# Patient Record
Sex: FEMALE | Race: BLACK OR AFRICAN AMERICAN
[De-identification: names, ages, dates, MRNs, and addresses within clinical notes are randomized per-mention and may not be internally consistent; named-entity substitution may affect disease eponyms.]

---

## 2018-02-26 ENCOUNTER — HOSPITAL ENCOUNTER (INPATIENT)
Dept: HOSPITAL 17 - HSDI | Age: 63
LOS: 3 days | Discharge: NURSING, REHAB OR LONG-TERM CARE | DRG: 470 | End: 2018-03-01
Attending: ORTHOPAEDIC SURGERY | Admitting: ORTHOPAEDIC SURGERY
Payer: COMMERCIAL

## 2018-02-26 VITALS — WEIGHT: 205.91 LBS | HEIGHT: 63 IN | BODY MASS INDEX: 36.48 KG/M2

## 2018-02-26 VITALS
SYSTOLIC BLOOD PRESSURE: 127 MMHG | TEMPERATURE: 96.6 F | RESPIRATION RATE: 18 BRPM | HEART RATE: 72 BPM | OXYGEN SATURATION: 96 % | DIASTOLIC BLOOD PRESSURE: 68 MMHG

## 2018-02-26 VITALS
DIASTOLIC BLOOD PRESSURE: 75 MMHG | OXYGEN SATURATION: 94 % | RESPIRATION RATE: 15 BRPM | TEMPERATURE: 97.5 F | SYSTOLIC BLOOD PRESSURE: 129 MMHG | HEART RATE: 80 BPM

## 2018-02-26 DIAGNOSIS — M65.861: ICD-10-CM

## 2018-02-26 DIAGNOSIS — M21.061: ICD-10-CM

## 2018-02-26 DIAGNOSIS — M06.9: ICD-10-CM

## 2018-02-26 DIAGNOSIS — E78.5: ICD-10-CM

## 2018-02-26 DIAGNOSIS — J45.909: ICD-10-CM

## 2018-02-26 DIAGNOSIS — K21.9: ICD-10-CM

## 2018-02-26 DIAGNOSIS — M17.11: Primary | ICD-10-CM

## 2018-02-26 PROCEDURE — 86900 BLOOD TYPING SEROLOGIC ABO: CPT

## 2018-02-26 PROCEDURE — 86920 COMPATIBILITY TEST SPIN: CPT

## 2018-02-26 PROCEDURE — 73560 X-RAY EXAM OF KNEE 1 OR 2: CPT

## 2018-02-26 PROCEDURE — 85014 HEMATOCRIT: CPT

## 2018-02-26 PROCEDURE — L1830 KO IMMOB CANVAS LONG PRE OTS: HCPCS

## 2018-02-26 PROCEDURE — 86901 BLOOD TYPING SEROLOGIC RH(D): CPT

## 2018-02-26 PROCEDURE — 86850 RBC ANTIBODY SCREEN: CPT

## 2018-02-26 PROCEDURE — 3E0T3BZ INTRODUCTION OF ANESTHETIC AGENT INTO PERIPHERAL NERVES AND PLEXI, PERCUTANEOUS APPROACH: ICD-10-PCS | Performed by: ANESTHESIOLOGY

## 2018-02-26 PROCEDURE — C1776 JOINT DEVICE (IMPLANTABLE): HCPCS

## 2018-02-26 PROCEDURE — 0SRC0J9 REPLACEMENT OF RIGHT KNEE JOINT WITH SYNTHETIC SUBSTITUTE, CEMENTED, OPEN APPROACH: ICD-10-PCS | Performed by: ORTHOPAEDIC SURGERY

## 2018-02-26 PROCEDURE — 94150 VITAL CAPACITY TEST: CPT

## 2018-02-26 PROCEDURE — 85018 HEMOGLOBIN: CPT

## 2018-02-26 RX ADMIN — ASPIRIN SCH MG: 81 TABLET ORAL at 21:19

## 2018-02-26 RX ADMIN — OXYBUTYNIN CHLORIDE SCH MG: 5 TABLET ORAL at 21:11

## 2018-02-26 RX ADMIN — LISINOPRIL SCH MG: 20 TABLET ORAL at 21:11

## 2018-02-26 RX ADMIN — OXYTOCIN SCH MLS/HR: 10 INJECTION, SOLUTION INTRAMUSCULAR; INTRAVENOUS at 15:06

## 2018-02-26 NOTE — MP
cc:

Ozzie Del Rosario MD

****

 

 

DATE OF OPERATION:

02/26/2018

 

PREOPERATIVE DIAGNOSIS:

Right knee severe tricompartment osteoarthritis, genu valgus 

deformity.

 

POSTOPERATIVE DIAGNOSIS:

Right knee severe tricompartment osteoarthritis, genu valgus 

deformity.

 

PROCEDURE:

Right total knee arthroplasty.

 

SURGEON:

Ozzie Del Rosario MD

 

ASSISTANT:

Eli Luevano PA-C

 

ANESTHESIA:

General, regional block.

 

COMPLICATIONS:

None.

 

ESTIMATED BLOOD LOSS:

50 mL.

 

DRAINS:

2.

 

TOURNIQUET TIME:

54 minutes at 275 mmHg.

 

CONDITION:

Stable.

 

PLAN OF ACTIVITY:

Per orders.

 

PROCEDURE:

My assistant, Eli Luevano, was present for the entire surgical 

case.   She was medically necessary for the entire case because of the

complexity of the case and to facilitate the performance of the 

procedure and with the  retractors, trial implants, permanent implants 

including bone cement.

 

Patient brought in the operating room, had satisfactory anesthesia by 

Dr. Sexton, department of anesthesia.  The right lower extremity was 

prepped and draped in usual sterile manner.  The extremity was 

exsanguinated by elevation and tourniquet placed to 275 mmHg.

 

A small anterior exposure to the was made.  Paramedian capsulotomy was

performed.  Patient was found to have significant synovitis to the 

knee with severe tricompartment osteoarthritis.  The remaining 

portions of the medial and lateral meniscus were removed.  Prepatellar

fat pad was excised.  The anterior cruciate ligament was resected and 

the posterior cruciate ligament was preserved.  Using the [employer] 

Outlist[employer] total knee arthroplasty system, IM guide was used for the 

distal femur cuts.  This was 5 degrees of valgus to accept a 62.5 mm 

femoral component.  Extramedullary guide was used for the tibia and 

this was to accept a 71 mm tibial component.  The undersurface of the 

patella was removed and this was to accept a 31 mm 3-pronged patellar 

prosthesis.  Patient found to have satisfactory balance in both 

flexion and extension with a 12 mm insert.  All trial components were 

removed.  Preparation for cementing was made.  This was 2 packages of 

[place] bone cement.  First, the tibial component was cemented, 

followed by the femoral component, then the patellar component.  When 

all excess bone cement was removed, the bone cement was allowed to 

harden for 11 minutes.  A 12 x 71 mm polyethylene plastic was used, in

which it was "cupped".  Again, patient was found to have excellent 

range of motion range of motion and satisfactory stability of both 

flexion and extension gaps.  The knee was irrigated with 3000 mL of 

sterile saline antibiotic solution.  Wound itself was dry.  The 

tourniquet was deflated.  All bleeders were coagulated.  The knee was 

irrigated with local anesthesia prior to the tourniquet coming down.  

The medication was provided by the department of pharmacy.  The 

lateral retinacular release was performed.  The patella was found to 

track well within the patellofemoral compartment with the "no thumbs 

technique".  The extension mechanism was repaired using multiple 

interrupted #2 Ti-Cron, subcutaneous tissue later with 0 Vicryl and 

3-0 Vicryl.  Skin was approximated with skin staples.  Sterile 

dressings were applied.  Patient tolerated the procedure well, went to

recovery room in stable and satisfactory condition.

 

 

__________________________________

MD ENMANUEL Cobos/GAMALIEL

D: 02/26/2018, 02:55 PM

T: 02/26/2018, 04:29 PM

Visit #: [id number]

Job #: [id number]

ZAFAR

## 2018-02-26 NOTE — RADRPT
EXAM DATE/TIME:  02/26/2018 15:19 

 

[facility] COMPARISON:     

No previous studies available for comparison.

 

                     

INDICATIONS :     

Post op right knee.

                     

 

MEDICAL HISTORY :     

None.          

 

SURGICAL HISTORY :     

None.   

 

ENCOUNTER:     

Initial                                        

 

ACUITY:     

1 day      

 

PAIN SCORE:     

Non-responsive.

 

LOCATION:     

Right  knee.

 

FINDINGS:     

Total knee arthroplasty is present. Hardware is intact. Alignment is anatomic. Surgical drains are no
yao. Skin staples are present ventrally.

 

CONCLUSION:     

Satisfactory post right TKA

 

 

 

 Gino Mclain MD on February 26, 2018 at 16:59           

Board Certified Radiologist.

 This report was verified electronically.

## 2018-02-26 NOTE — HHI.PR
__________________________________________________





Immediate Post Op Note


Procedure Date:


Feb 26, 2018


Pre Op Diagnosis:  


R Knee severe OA,RA,genu valgus deformity


Post Op Diagnosis:  


Same


Surgeon:


Ozzie Del Rosario MD


Assistant(s):


Eli Luevano PA-C


Procedure:


R TKR


Complications:


None


Specimen(s) removed:


None


Estimated blood loss:


50cc


Anesthesia:  General, Regional Block, Local


Drains:  Hemovac


Patient to:  PACU


Patient Condition:  Good


Implant/Devices:  SEE IMPLANT LOG (if applicable)


Date/Time of Procedure:  SEE SURGICAL CARE RECORD











Ozzie Del Rosario MD Feb 26, 2018 15:08

## 2018-02-27 VITALS
TEMPERATURE: 96.8 F | DIASTOLIC BLOOD PRESSURE: 60 MMHG | RESPIRATION RATE: 17 BRPM | SYSTOLIC BLOOD PRESSURE: 108 MMHG | HEART RATE: 71 BPM | OXYGEN SATURATION: 97 %

## 2018-02-27 VITALS
TEMPERATURE: 97.8 F | OXYGEN SATURATION: 97 % | RESPIRATION RATE: 15 BRPM | DIASTOLIC BLOOD PRESSURE: 66 MMHG | HEART RATE: 73 BPM | SYSTOLIC BLOOD PRESSURE: 106 MMHG

## 2018-02-27 VITALS
RESPIRATION RATE: 18 BRPM | SYSTOLIC BLOOD PRESSURE: 108 MMHG | HEART RATE: 93 BPM | DIASTOLIC BLOOD PRESSURE: 67 MMHG | TEMPERATURE: 98.3 F | OXYGEN SATURATION: 97 %

## 2018-02-27 VITALS
SYSTOLIC BLOOD PRESSURE: 129 MMHG | DIASTOLIC BLOOD PRESSURE: 82 MMHG | HEART RATE: 86 BPM | RESPIRATION RATE: 20 BRPM | TEMPERATURE: 98.4 F | OXYGEN SATURATION: 97 %

## 2018-02-27 VITALS
SYSTOLIC BLOOD PRESSURE: 114 MMHG | RESPIRATION RATE: 19 BRPM | TEMPERATURE: 97.2 F | HEART RATE: 98 BPM | OXYGEN SATURATION: 99 % | DIASTOLIC BLOOD PRESSURE: 82 MMHG

## 2018-02-27 VITALS — OXYGEN SATURATION: 98 %

## 2018-02-27 LAB
HCT VFR BLD CALC: 30.4 % (ref 35–46)
HGB BLD-MCNC: 10.2 GM/DL (ref 11.6–15.3)

## 2018-02-27 RX ADMIN — PRAVASTATIN SODIUM SCH MG: 80 TABLET ORAL at 07:30

## 2018-02-27 RX ADMIN — PANTOPRAZOLE SODIUM SCH MG: 20 TABLET, DELAYED RELEASE ORAL at 07:30

## 2018-02-27 RX ADMIN — ASPIRIN SCH MG: 81 TABLET ORAL at 20:46

## 2018-02-27 RX ADMIN — LISINOPRIL SCH MG: 20 TABLET ORAL at 20:46

## 2018-02-27 RX ADMIN — OXYBUTYNIN CHLORIDE SCH MG: 5 TABLET ORAL at 07:30

## 2018-02-27 RX ADMIN — OXYTOCIN SCH MLS/HR: 10 INJECTION, SOLUTION INTRAMUSCULAR; INTRAVENOUS at 03:36

## 2018-02-27 RX ADMIN — LISINOPRIL SCH MG: 20 TABLET ORAL at 07:30

## 2018-02-27 RX ADMIN — MAGNESIUM OXIDE TAB 400 MG (241.3 MG ELEMENTAL MG) SCH MG: 400 (241.3 MG) TAB at 11:57

## 2018-02-27 RX ADMIN — ASPIRIN SCH MG: 81 TABLET ORAL at 07:30

## 2018-02-27 RX ADMIN — OXYBUTYNIN CHLORIDE SCH MG: 5 TABLET ORAL at 20:46

## 2018-02-28 VITALS
DIASTOLIC BLOOD PRESSURE: 72 MMHG | RESPIRATION RATE: 17 BRPM | SYSTOLIC BLOOD PRESSURE: 121 MMHG | HEART RATE: 90 BPM | TEMPERATURE: 98.6 F | OXYGEN SATURATION: 95 %

## 2018-02-28 VITALS
DIASTOLIC BLOOD PRESSURE: 81 MMHG | OXYGEN SATURATION: 99 % | HEART RATE: 88 BPM | SYSTOLIC BLOOD PRESSURE: 143 MMHG | TEMPERATURE: 95.6 F | RESPIRATION RATE: 17 BRPM

## 2018-02-28 VITALS
DIASTOLIC BLOOD PRESSURE: 78 MMHG | HEART RATE: 77 BPM | OXYGEN SATURATION: 96 % | SYSTOLIC BLOOD PRESSURE: 125 MMHG | RESPIRATION RATE: 17 BRPM | TEMPERATURE: 98.6 F

## 2018-02-28 VITALS
TEMPERATURE: 98.5 F | SYSTOLIC BLOOD PRESSURE: 150 MMHG | HEART RATE: 88 BPM | RESPIRATION RATE: 18 BRPM | DIASTOLIC BLOOD PRESSURE: 79 MMHG | OXYGEN SATURATION: 97 %

## 2018-02-28 VITALS
HEART RATE: 84 BPM | DIASTOLIC BLOOD PRESSURE: 80 MMHG | RESPIRATION RATE: 17 BRPM | TEMPERATURE: 97.8 F | SYSTOLIC BLOOD PRESSURE: 142 MMHG | OXYGEN SATURATION: 97 %

## 2018-02-28 VITALS
RESPIRATION RATE: 16 BRPM | OXYGEN SATURATION: 95 % | TEMPERATURE: 98.6 F | SYSTOLIC BLOOD PRESSURE: 139 MMHG | HEART RATE: 90 BPM | DIASTOLIC BLOOD PRESSURE: 74 MMHG

## 2018-02-28 VITALS — DIASTOLIC BLOOD PRESSURE: 81 MMHG | SYSTOLIC BLOOD PRESSURE: 142 MMHG

## 2018-02-28 VITALS
RESPIRATION RATE: 18 BRPM | DIASTOLIC BLOOD PRESSURE: 98 MMHG | SYSTOLIC BLOOD PRESSURE: 155 MMHG | OXYGEN SATURATION: 95 % | HEART RATE: 88 BPM | TEMPERATURE: 100.4 F

## 2018-02-28 RX ADMIN — ASPIRIN SCH MG: 81 TABLET ORAL at 21:02

## 2018-02-28 RX ADMIN — OXYBUTYNIN CHLORIDE SCH MG: 5 TABLET ORAL at 08:52

## 2018-02-28 RX ADMIN — OXYTOCIN SCH MLS/HR: 10 INJECTION, SOLUTION INTRAMUSCULAR; INTRAVENOUS at 17:06

## 2018-02-28 RX ADMIN — PRAVASTATIN SODIUM SCH MG: 80 TABLET ORAL at 08:52

## 2018-02-28 RX ADMIN — ASPIRIN SCH MG: 81 TABLET ORAL at 08:52

## 2018-02-28 RX ADMIN — MAGNESIUM HYDROXIDE SCH ML: 400 SUSPENSION ORAL at 21:02

## 2018-02-28 RX ADMIN — PANTOPRAZOLE SODIUM SCH MG: 20 TABLET, DELAYED RELEASE ORAL at 08:52

## 2018-02-28 RX ADMIN — OXYBUTYNIN CHLORIDE SCH MG: 5 TABLET ORAL at 21:02

## 2018-02-28 RX ADMIN — OXYTOCIN SCH MLS/HR: 10 INJECTION, SOLUTION INTRAMUSCULAR; INTRAVENOUS at 04:36

## 2018-02-28 RX ADMIN — MAGNESIUM OXIDE TAB 400 MG (241.3 MG ELEMENTAL MG) SCH MG: 400 (241.3 MG) TAB at 10:57

## 2018-02-28 RX ADMIN — ACETAMINOPHEN PRN MG: 500 TABLET ORAL at 22:19

## 2018-02-28 RX ADMIN — MAGNESIUM HYDROXIDE SCH ML: 400 SUSPENSION ORAL at 08:53

## 2018-02-28 RX ADMIN — LISINOPRIL SCH MG: 20 TABLET ORAL at 21:02

## 2018-02-28 RX ADMIN — LISINOPRIL SCH MG: 20 TABLET ORAL at 08:52

## 2018-02-28 NOTE — PD.ORT.PN
Subjective


Subjective Remarks


pt complains of post op knee soreness





Objective


Vitals





Vital Signs








  Date Time  Temp Pulse Resp B/P (MAP) Pulse Ox O2 Delivery O2 Flow Rate FiO2


 


2/28/18 04:00 98.6 90 16 139/74 (95) 95   


 


2/28/18 00:00 98.6 90 17 121/72 (88) 95   


 


2/27/18 20:00 98.4 86 20 129/82 (98) 97   


 


2/27/18 16:00 97.2 98 19 114/82 (93) 99   


 


2/27/18 12:04 98.3 93 18 108/67 (81) 97   


 


2/27/18 08:25     98   21


 


2/27/18 08:00 96.8 71 17 108/60 (76) 97   














I/O      


 


 2/27/18 2/27/18 2/27/18 2/28/18 2/28/18 2/28/18





 07:00 15:00 23:00 07:00 15:00 23:00


 


Intake Total 400 ml 1200 ml  480 ml  


 


Output Total 130 ml 5 ml 60 ml   


 


Balance 270 ml 1195 ml -60 ml 480 ml  


 


      


 


Intake Oral 300 ml 1200 ml  480 ml  


 


IV Total 100 ml     


 


Output Urine Total  5 ml    


 


Drainage Total 130 ml  60 ml   


 


# Voids 4   2  


 


# Bowel Movements  0  0  








Result Diagram:  


2/27/18 0528





Imaging





Last 24 hours Impressions








Knee X-Ray 2/26/18 1506 Signed





Impressions: 





 Service Date/Time:  Monday, February 26, 2018 15:19 - CONCLUSION:  

Satisfactory 





 post right TKA     Gino Mclain MD 








Objective Remarks


right knee dressings dry and intact


N/V intact


Neg hosea's, no calf tenderness





Assessment & Plan


Assessment and Plan


POD # 2 s/p  R TKA


Ortho stable


PT,Rehab


Aspirin EC 81 mg BID x 4 weeks TEDS for DVT prophylaxsis


bowel regimen 


D/C to SNF today











Eli Luevano Feb 28, 2018 07:37

## 2018-03-01 VITALS
DIASTOLIC BLOOD PRESSURE: 73 MMHG | HEART RATE: 80 BPM | OXYGEN SATURATION: 98 % | TEMPERATURE: 97.9 F | RESPIRATION RATE: 18 BRPM | SYSTOLIC BLOOD PRESSURE: 123 MMHG

## 2018-03-01 VITALS
DIASTOLIC BLOOD PRESSURE: 71 MMHG | SYSTOLIC BLOOD PRESSURE: 118 MMHG | OXYGEN SATURATION: 98 % | HEART RATE: 80 BPM | TEMPERATURE: 97.2 F | RESPIRATION RATE: 17 BRPM

## 2018-03-01 VITALS — RESPIRATION RATE: 18 BRPM

## 2018-03-01 RX ADMIN — MAGNESIUM HYDROXIDE SCH ML: 400 SUSPENSION ORAL at 08:10

## 2018-03-01 RX ADMIN — ASPIRIN SCH MG: 81 TABLET ORAL at 08:09

## 2018-03-01 RX ADMIN — OXYTOCIN SCH MLS/HR: 10 INJECTION, SOLUTION INTRAMUSCULAR; INTRAVENOUS at 05:36

## 2018-03-01 RX ADMIN — ACETAMINOPHEN PRN MG: 500 TABLET ORAL at 05:43

## 2018-03-01 RX ADMIN — OXYBUTYNIN CHLORIDE SCH MG: 5 TABLET ORAL at 08:09

## 2018-03-01 RX ADMIN — PANTOPRAZOLE SODIUM SCH MG: 20 TABLET, DELAYED RELEASE ORAL at 08:09

## 2018-03-01 RX ADMIN — LISINOPRIL SCH MG: 20 TABLET ORAL at 08:09

## 2018-03-01 RX ADMIN — MAGNESIUM OXIDE TAB 400 MG (241.3 MG ELEMENTAL MG) SCH MG: 400 (241.3 MG) TAB at 11:22

## 2018-03-01 RX ADMIN — ACETAMINOPHEN PRN MG: 500 TABLET ORAL at 11:23

## 2018-03-01 RX ADMIN — PRAVASTATIN SODIUM SCH MG: 80 TABLET ORAL at 08:09

## 2018-03-01 NOTE — PD.ORT.PN
Subjective


Subjective Remarks


POD#3 R TKR


patient doing well


ready to be discharged to [facility] today





Objective


Vitals





Vital Signs








  Date Time  Temp Pulse Resp B/P (MAP) Pulse Ox O2 Delivery O2 Flow Rate FiO2


 


3/1/18 12:30   18     


 


3/1/18 12:06 97.9 80 18 123/73 (90) 98   


 


3/1/18 08:00 97.2 80 17 118/71 (87) 98   


 


2/28/18 23:20 98.5 88 18 150/79 (102) 97   


 


2/28/18 22:17    142/81 (101)    


 


2/28/18 20:50 100.4 88 18 155/98 (117) 95   


 


2/28/18 16:00 97.8 84 17 142/80 (100) 97   














I/O      


 


 2/28/18 2/28/18 2/28/18 3/1/18 3/1/18 3/1/18





 07:00 15:00 23:00 07:00 15:00 23:00


 


Intake Total 480 ml 480 ml 480 ml 240 ml  


 


Balance 480 ml 480 ml 480 ml 240 ml  


 


      


 


Intake Oral 480 ml 480 ml 480 ml 240 ml  


 


# Voids 2 4 3 2  


 


# Bowel Movements 0 0 0 0  








Result Diagram:  


2/27/18 0528





Imaging





Last 24 hours Impressions








Knee X-Ray 2/26/18 1506 Signed





Impressions: 





 Service Date/Time:  Monday, February 26, 2018 15:19 - CONCLUSION:  

Satisfactory 





 post right TKA     Gino Mclain MD 








Objective Remarks


right knee dressings dry and intact


N/V intact


Neg hosea's, no calf tenderness





Assessment & Plan


Assessment and Plan


POD # 3 s/p  R TKA


Ortho stable


PT,Rehab


Aspirin EC 81 mg BID x 4 weeks TEDS for DVT prophylaxsis


D/C to [facility] today











Ozzie Del Rosario MD Mar 1, 2018 13:59

## 2020-08-02 NOTE — PD.ORT.PN
Subjective


Subjective Remarks


POD#1 R TKR


No sob,chest pain


No c/o pain


Explained operative findings to patient and answered multiple questions





Objective


Vitals





Vital Signs








  Date Time  Temp Pulse Resp B/P (MAP) Pulse Ox O2 Delivery O2 Flow Rate FiO2


 


2/27/18 00:00 97.8 73 15 106/66 (79) 97   


 


2/26/18 21:12   18     


 


2/26/18 20:00 97.5 80 15 129/75 (93) 94   


 


2/26/18 18:00 96.6 72 18 127/68 (87) 96   


 


2/26/18 17:30 97.6 72 20 146/69 (94) 95 Nasal Cannula 2 


 


2/26/18 17:00  72 20 146/69 (94) 95 Nasal Cannula 2 


 


2/26/18 16:00  64 20 148/80 (102) 95 Nasal Cannula 2 


 


2/26/18 15:45  64 20 148/82 (104) 100 Nasal Cannula 2 


 


2/26/18 15:30  67 20 152/75 (100) 100 Nasal Cannula 2 


 


2/26/18 15:15  67 20 157/75 (102) 100 Nasal Cannula 2 


 


2/26/18 15:07 97.6 74 20 143/77 (99) 100 Nasal Cannula 2 


 


2/26/18 11:47     100 Nasal Cannula 2 


 


2/26/18 10:47 97.7 68 18 142/68 (92) 100   














I/O      


 


 2/26/18 2/26/18 2/26/18 2/27/18 2/27/18 2/27/18





 07:00 15:00 23:00 07:00 15:00 23:00


 


Intake Total   2600 ml 100 ml  


 


Output Total   3400 ml 130 ml  


 


Balance   -800 ml -30 ml  


 


      


 


Intake Oral   500 ml   


 


IV Total   2100 ml 100 ml  


 


Output Urine Total   350 ml   


 


Drainage Total    130 ml  


 


Estimated Blood Loss   50 ml   


 


Other   3000 ml   


 


# Voids   3   








Result Diagram:  


2/27/18 0528





Imaging





Last 24 hours Impressions








Knee X-Ray 2/26/18 1506 Signed





Impressions: 





 Service Date/Time:  Monday, February 26, 2018 15:19 - CONCLUSION:  

Satisfactory 





 post right TKA     Gino Mclain MD 








Objective Remarks


N/V intact


Dressings dry


Neg hosea's;no calf tenderness





Assessment & Plan


Assessment and Plan


Ortho stable


PT,Rehab


Aspirin EC 81 mg BID x 4 weeks TEDS for DVT prophylaxsis


D/C to SNF tomorrow











Ozzie Del Rosario MD Feb 27, 2018 07:01 None